# Patient Record
Sex: FEMALE | Race: WHITE | Employment: PART TIME | URBAN - METROPOLITAN AREA
[De-identification: names, ages, dates, MRNs, and addresses within clinical notes are randomized per-mention and may not be internally consistent; named-entity substitution may affect disease eponyms.]

---

## 2021-10-28 ENCOUNTER — HOSPITAL ENCOUNTER (OUTPATIENT)
Age: 41
Setting detail: OUTPATIENT SURGERY
Discharge: HOME | End: 2021-10-28
Attending: PAIN MEDICINE | Admitting: PAIN MEDICINE
Payer: COMMERCIAL

## 2021-10-28 VITALS
SYSTOLIC BLOOD PRESSURE: 115 MMHG | OXYGEN SATURATION: 100 % | HEART RATE: 57 BPM | RESPIRATION RATE: 14 BRPM | DIASTOLIC BLOOD PRESSURE: 60 MMHG

## 2021-10-28 PROCEDURE — 2500000003 HC RX 250 WO HCPCS

## 2021-10-28 PROCEDURE — 2500000003 HC RX 250 WO HCPCS: Performed by: PAIN MEDICINE

## 2021-10-28 PROCEDURE — 6360000004 HC RX CONTRAST MEDICATION: Performed by: PAIN MEDICINE

## 2021-10-28 PROCEDURE — 3600000002 HC SURGERY LEVEL 2 BASE: Performed by: PAIN MEDICINE

## 2021-10-28 PROCEDURE — 2709999900 HC NON-CHARGEABLE SUPPLY: Performed by: PAIN MEDICINE

## 2021-10-28 PROCEDURE — 2580000003 HC RX 258: Performed by: PAIN MEDICINE

## 2021-10-28 PROCEDURE — 6360000002 HC RX W HCPCS: Performed by: PAIN MEDICINE

## 2021-10-28 PROCEDURE — 62321 NJX INTERLAMINAR CRV/THRC: CPT | Performed by: PAIN MEDICINE

## 2021-10-28 PROCEDURE — 7100000011 HC PHASE II RECOVERY - ADDTL 15 MIN: Performed by: PAIN MEDICINE

## 2021-10-28 PROCEDURE — 7100000010 HC PHASE II RECOVERY - FIRST 15 MIN: Performed by: PAIN MEDICINE

## 2021-10-28 PROCEDURE — 99152 MOD SED SAME PHYS/QHP 5/>YRS: CPT | Performed by: PAIN MEDICINE

## 2021-10-28 RX ORDER — SODIUM CHLORIDE, SODIUM LACTATE, POTASSIUM CHLORIDE, CALCIUM CHLORIDE 600; 310; 30; 20 MG/100ML; MG/100ML; MG/100ML; MG/100ML
INJECTION, SOLUTION INTRAVENOUS CONTINUOUS
Status: DISCONTINUED | OUTPATIENT
Start: 2021-10-28 | End: 2021-10-28 | Stop reason: HOSPADM

## 2021-10-28 RX ORDER — METHYLPREDNISOLONE ACETATE 80 MG/ML
INJECTION, SUSPENSION INTRA-ARTICULAR; INTRALESIONAL; INTRAMUSCULAR; SOFT TISSUE
Status: DISCONTINUED
Start: 2021-10-28 | End: 2021-10-28 | Stop reason: HOSPADM

## 2021-10-28 RX ORDER — IBUPROFEN 400 MG/1
400 TABLET ORAL EVERY 6 HOURS PRN
COMMUNITY

## 2021-10-28 RX ORDER — FENTANYL CITRATE 50 UG/ML
INJECTION, SOLUTION INTRAMUSCULAR; INTRAVENOUS
Status: DISCONTINUED
Start: 2021-10-28 | End: 2021-10-28 | Stop reason: HOSPADM

## 2021-10-28 RX ORDER — QUETIAPINE FUMARATE 25 MG/1
25 TABLET, FILM COATED ORAL
COMMUNITY

## 2021-10-28 RX ORDER — MIDAZOLAM HYDROCHLORIDE 1 MG/ML
INJECTION INTRAMUSCULAR; INTRAVENOUS PRN
Status: DISCONTINUED | OUTPATIENT
Start: 2021-10-28 | End: 2021-10-28 | Stop reason: ALTCHOICE

## 2021-10-28 RX ORDER — LIDOCAINE HYDROCHLORIDE 10 MG/ML
INJECTION, SOLUTION EPIDURAL; INFILTRATION; INTRACAUDAL; PERINEURAL
Status: COMPLETED
Start: 2021-10-28 | End: 2021-10-28

## 2021-10-28 RX ORDER — METHYLPREDNISOLONE ACETATE 80 MG/ML
INJECTION, SUSPENSION INTRA-ARTICULAR; INTRALESIONAL; INTRAMUSCULAR; SOFT TISSUE PRN
Status: DISCONTINUED | OUTPATIENT
Start: 2021-10-28 | End: 2021-10-28 | Stop reason: ALTCHOICE

## 2021-10-28 RX ORDER — LIDOCAINE HYDROCHLORIDE 10 MG/ML
5 INJECTION, SOLUTION EPIDURAL; INFILTRATION; INTRACAUDAL; PERINEURAL ONCE
Status: COMPLETED | OUTPATIENT
Start: 2021-10-28 | End: 2021-10-28

## 2021-10-28 RX ORDER — FENTANYL CITRATE 50 UG/ML
INJECTION, SOLUTION INTRAMUSCULAR; INTRAVENOUS PRN
Status: DISCONTINUED | OUTPATIENT
Start: 2021-10-28 | End: 2021-10-28 | Stop reason: ALTCHOICE

## 2021-10-28 RX ORDER — FLUOXETINE HYDROCHLORIDE 20 MG/1
20 CAPSULE ORAL DAILY
Status: ON HOLD | COMMUNITY
End: 2022-04-07

## 2021-10-28 RX ORDER — MIDAZOLAM HYDROCHLORIDE 1 MG/ML
INJECTION INTRAMUSCULAR; INTRAVENOUS
Status: DISCONTINUED
Start: 2021-10-28 | End: 2021-10-28 | Stop reason: HOSPADM

## 2021-10-28 RX ORDER — ALPRAZOLAM 0.5 MG/1
0.5 TABLET ORAL NIGHTLY PRN
COMMUNITY

## 2021-10-28 RX ORDER — LIDOCAINE HYDROCHLORIDE 10 MG/ML
INJECTION, SOLUTION EPIDURAL; INFILTRATION; INTRACAUDAL; PERINEURAL PRN
Status: DISCONTINUED | OUTPATIENT
Start: 2021-10-28 | End: 2021-10-28 | Stop reason: ALTCHOICE

## 2021-10-28 RX ADMIN — SODIUM CHLORIDE, POTASSIUM CHLORIDE, SODIUM LACTATE AND CALCIUM CHLORIDE: 600; 310; 30; 20 INJECTION, SOLUTION INTRAVENOUS at 16:09

## 2021-10-28 RX ADMIN — LIDOCAINE HYDROCHLORIDE 0.1 ML: 10 INJECTION, SOLUTION EPIDURAL; INFILTRATION; INTRACAUDAL; PERINEURAL at 16:01

## 2021-10-28 ASSESSMENT — PAIN SCALES - GENERAL: PAINLEVEL_OUTOF10: 4

## 2021-10-28 ASSESSMENT — PAIN DESCRIPTION - LOCATION: LOCATION: NECK;SHOULDER

## 2021-10-28 ASSESSMENT — PAIN DESCRIPTION - ORIENTATION: ORIENTATION: LEFT

## 2021-10-28 NOTE — PROGRESS NOTES
Discharge instructions reviewed with pt's family- states understanding. Pt states ready for discharge.

## 2021-10-28 NOTE — PROCEDURES
Nahid Cat is a 37 y.o. female patient. No diagnosis found. No past medical history on file. Blood pressure 130/84, pulse 80, SpO2 100 %. Ila Solis MD  10/28/2021      DICTATING PHYSICIAN: Maria Del Rosario Meehan M.D        PREOPERATIVE DIAGNOSES: Cervical radiculopathy 723.4, M54.12       POSTOPERATIVE DIAGNOSES: Cervical radiculopathy       OPERATIVE PROCEDURES:  Cervical epidural steroid injection under fluoroscopic guidance at _L C67__ level, intra-laminar approach. [facility] with [address] [place] [address] Road: Maria Del Rosario Meehan M.D   INDICATIONS:  Cervical radiculopathy  OPERATIVE PROCEDURE:  Consent signed by patient after risks and benefits explained. Patient was in prone position. Neck was prepped with Prevail and draped. Aseptic technique used at every stage of the procedure. Skin wheal with 1% Lidocaine with 30-gauge needle. _20__ -gauge Tuohy needle placed under AP fluoroscopic guidance to contact the superior part of the lamina at _C7__ level on the _L__ side. The spinous process was in midline. The needle was then advanced anteriorly and superiorly under fluoroscopic guidance into the interlaminar epidural space with the help of loss of resistance technique. Aspiration was negative for CSF and blood. Injection of Omnipaque dye(0.5cc) showed appropriate spread confirming epidural needle placement. _0_ cc of dye with _80__ mg of DEPOMEDROL was injected. Needle pulled out intact. Patient tolerated procedure well. Discharge instructions were given. Injection under low pressure. Patient was monitored and stable.      ESTIMATED BLOOD LOSS:  Less than 1 cc    <<Signature User>>   ________________________________  Maria Del Rosario Meehan M.D.

## 2022-04-07 ENCOUNTER — HOSPITAL ENCOUNTER (OUTPATIENT)
Age: 42
Setting detail: OUTPATIENT SURGERY
Discharge: HOME | End: 2022-04-07
Attending: PAIN MEDICINE | Admitting: PAIN MEDICINE
Payer: COMMERCIAL

## 2022-04-07 VITALS
SYSTOLIC BLOOD PRESSURE: 108 MMHG | RESPIRATION RATE: 16 BRPM | TEMPERATURE: 97.7 F | DIASTOLIC BLOOD PRESSURE: 65 MMHG | BODY MASS INDEX: 23.32 KG/M2 | HEART RATE: 62 BPM | HEIGHT: 65 IN | WEIGHT: 140 LBS | OXYGEN SATURATION: 99 %

## 2022-04-07 PROCEDURE — 6360000004 HC RX CONTRAST MEDICATION: Performed by: PAIN MEDICINE

## 2022-04-07 PROCEDURE — 7100000011 HC PHASE II RECOVERY - ADDTL 15 MIN: Performed by: PAIN MEDICINE

## 2022-04-07 PROCEDURE — 3600000012 HC SURGERY LEVEL 2 ADDTL 15MIN: Performed by: PAIN MEDICINE

## 2022-04-07 PROCEDURE — 7100000010 HC PHASE II RECOVERY - FIRST 15 MIN: Performed by: PAIN MEDICINE

## 2022-04-07 PROCEDURE — 2709999900 HC NON-CHARGEABLE SUPPLY: Performed by: PAIN MEDICINE

## 2022-04-07 PROCEDURE — 62321 NJX INTERLAMINAR CRV/THRC: CPT | Performed by: PAIN MEDICINE

## 2022-04-07 PROCEDURE — 2500000003 HC RX 250 WO HCPCS: Performed by: PAIN MEDICINE

## 2022-04-07 PROCEDURE — 6360000002 HC RX W HCPCS: Performed by: PAIN MEDICINE

## 2022-04-07 PROCEDURE — 99152 MOD SED SAME PHYS/QHP 5/>YRS: CPT | Performed by: PAIN MEDICINE

## 2022-04-07 PROCEDURE — 2580000003 HC RX 258: Performed by: PAIN MEDICINE

## 2022-04-07 PROCEDURE — 3600000002 HC SURGERY LEVEL 2 BASE: Performed by: PAIN MEDICINE

## 2022-04-07 RX ORDER — MIDAZOLAM HYDROCHLORIDE 1 MG/ML
INJECTION INTRAMUSCULAR; INTRAVENOUS
Status: DISCONTINUED
Start: 2022-04-07 | End: 2022-04-07 | Stop reason: HOSPADM

## 2022-04-07 RX ORDER — SODIUM CHLORIDE, SODIUM LACTATE, POTASSIUM CHLORIDE, CALCIUM CHLORIDE 600; 310; 30; 20 MG/100ML; MG/100ML; MG/100ML; MG/100ML
INJECTION, SOLUTION INTRAVENOUS CONTINUOUS
Status: DISCONTINUED | OUTPATIENT
Start: 2022-04-07 | End: 2022-04-07 | Stop reason: HOSPADM

## 2022-04-07 RX ORDER — MIDAZOLAM HYDROCHLORIDE 1 MG/ML
INJECTION INTRAMUSCULAR; INTRAVENOUS PRN
Status: DISCONTINUED | OUTPATIENT
Start: 2022-04-07 | End: 2022-04-07 | Stop reason: ALTCHOICE

## 2022-04-07 RX ORDER — BETAMETHASONE SODIUM PHOSPHATE AND BETAMETHASONE ACETATE 3; 3 MG/ML; MG/ML
INJECTION, SUSPENSION INTRA-ARTICULAR; INTRALESIONAL; INTRAMUSCULAR; SOFT TISSUE
Status: DISCONTINUED
Start: 2022-04-07 | End: 2022-04-07 | Stop reason: HOSPADM

## 2022-04-07 RX ORDER — LIDOCAINE HYDROCHLORIDE 10 MG/ML
INJECTION, SOLUTION EPIDURAL; INFILTRATION; INTRACAUDAL; PERINEURAL PRN
Status: DISCONTINUED | OUTPATIENT
Start: 2022-04-07 | End: 2022-04-07 | Stop reason: ALTCHOICE

## 2022-04-07 RX ADMIN — SODIUM CHLORIDE, POTASSIUM CHLORIDE, SODIUM LACTATE AND CALCIUM CHLORIDE: 600; 310; 30; 20 INJECTION, SOLUTION INTRAVENOUS at 12:58

## 2022-04-07 RX ADMIN — LIDOCAINE HYDROCHLORIDE 0.1 ML: 10 INJECTION, SOLUTION EPIDURAL; INFILTRATION; INTRACAUDAL; PERINEURAL at 12:58

## 2022-04-07 ASSESSMENT — PAIN - FUNCTIONAL ASSESSMENT
PAIN_FUNCTIONAL_ASSESSMENT: PREVENTS OR INTERFERES WITH MANY ACTIVE NOT PASSIVE ACTIVITIES
PAIN_FUNCTIONAL_ASSESSMENT: 0-10

## 2022-04-07 NOTE — PROCEDURES
Walt Marley is a 37 y.o. female patient. No diagnosis found. History reviewed. No pertinent past medical history. Blood pressure 93/64, pulse 70, temperature 97.7 °F (36.5 °C), temperature source Temporal, resp. rate 16, height 5' 5\" (1.651 m), weight 140 lb (63.5 kg), SpO2 96 %. Kristal Al MD  4/7/2022      DICTATING PHYSICIAN: Lesli Kern M.D        PREOPERATIVE DIAGNOSES: Cervical radiculopathy 723.4, M54.12       POSTOPERATIVE DIAGNOSES: Cervical radiculopathy       OPERATIVE PROCEDURES:  Cervical epidural steroid injection under fluoroscopic guidance at _L C67__ level, intra-laminar approach. [id number] with [facility] [address]: Lesli Kern M.D   INDICATIONS:  Cervical radiculopathy  OPERATIVE PROCEDURE:  Consent signed by patient after risks and benefits explained. Patient was in prone position. Neck was prepped with Prevail and draped. Aseptic technique used at every stage of the procedure. Skin wheal with 1% Lidocaine with 30-gauge needle. _20__ -gauge Tuohy needle placed under AP fluoroscopic guidance to contact the superior part of the lamina at _C7__ level on the _L__ side. The spinous process was in midline. The needle was then advanced anteriorly and superiorly under fluoroscopic guidance into the interlaminar epidural space with the help of loss of resistance technique. Aspiration was negative for CSF and blood. Injection of Omnipaque dye(0.5cc) showed appropriate spread confirming epidural needle placement. _0_ cc of dye with _9 mg of CELESTONE was injected. Needle pulled out intact. Patient tolerated procedure well. Discharge instructions were given. Injection under low pressure. Patient was monitored and stable.      ESTIMATED BLOOD LOSS:  Less than 1 cc    <<Signature User>>   ________________________________  Lesli Kern M.D.

## 2022-04-07 NOTE — PROGRESS NOTES
Pt walked out of PACU, tolerable pain. No weakness or tingling. Discharge instructions given to pt, verbalized understanding and states no questions at this time.  driving pt home.

## 2022-06-23 ENCOUNTER — HOSPITAL ENCOUNTER (OUTPATIENT)
Age: 42
Setting detail: OUTPATIENT SURGERY
Discharge: HOME | End: 2022-06-23
Attending: PAIN MEDICINE | Admitting: PAIN MEDICINE
Payer: COMMERCIAL

## 2022-06-23 VITALS
OXYGEN SATURATION: 100 % | BODY MASS INDEX: 23.32 KG/M2 | HEART RATE: 80 BPM | HEIGHT: 65 IN | SYSTOLIC BLOOD PRESSURE: 131 MMHG | DIASTOLIC BLOOD PRESSURE: 87 MMHG | TEMPERATURE: 98.4 F | RESPIRATION RATE: 16 BRPM | WEIGHT: 140 LBS

## 2022-06-23 PROCEDURE — 64491 INJ PARAVERT F JNT C/T 2 LEV: CPT | Performed by: PAIN MEDICINE

## 2022-06-23 PROCEDURE — 3600000002 HC SURGERY LEVEL 2 BASE: Performed by: PAIN MEDICINE

## 2022-06-23 PROCEDURE — 7100000011 HC PHASE II RECOVERY - ADDTL 15 MIN: Performed by: PAIN MEDICINE

## 2022-06-23 PROCEDURE — 7100000010 HC PHASE II RECOVERY - FIRST 15 MIN: Performed by: PAIN MEDICINE

## 2022-06-23 PROCEDURE — 64490 INJ PARAVERT F JNT C/T 1 LEV: CPT | Performed by: PAIN MEDICINE

## 2022-06-23 PROCEDURE — 2709999900 HC NON-CHARGEABLE SUPPLY: Performed by: PAIN MEDICINE

## 2022-06-23 PROCEDURE — 2500000003 HC RX 250 WO HCPCS: Performed by: PAIN MEDICINE

## 2022-06-23 RX ORDER — ESCITALOPRAM OXALATE 10 MG/1
10 TABLET ORAL DAILY
COMMUNITY

## 2022-06-23 RX ORDER — BUPIVACAINE HYDROCHLORIDE 7.5 MG/ML
INJECTION, SOLUTION EPIDURAL; RETROBULBAR
Status: DISCONTINUED
Start: 2022-06-23 | End: 2022-06-23 | Stop reason: HOSPADM

## 2022-06-23 RX ORDER — BUPIVACAINE HYDROCHLORIDE 7.5 MG/ML
INJECTION, SOLUTION EPIDURAL; RETROBULBAR PRN
Status: DISCONTINUED | OUTPATIENT
Start: 2022-06-23 | End: 2022-06-23 | Stop reason: ALTCHOICE

## 2022-06-23 RX ORDER — LIDOCAINE HYDROCHLORIDE 10 MG/ML
INJECTION, SOLUTION EPIDURAL; INFILTRATION; INTRACAUDAL; PERINEURAL PRN
Status: DISCONTINUED | OUTPATIENT
Start: 2022-06-23 | End: 2022-06-23 | Stop reason: ALTCHOICE

## 2022-06-23 ASSESSMENT — PAIN - FUNCTIONAL ASSESSMENT
PAIN_FUNCTIONAL_ASSESSMENT: 0-10
PAIN_FUNCTIONAL_ASSESSMENT: PREVENTS OR INTERFERES WITH MANY ACTIVE NOT PASSIVE ACTIVITIES

## 2022-06-23 NOTE — PROGRESS NOTES
Patient is very impressed. Pain has eased a lot and has great range of motion now. Rates it at least 90% better. Dr. Livier Ronquillo aware.

## 2022-06-23 NOTE — PROCEDURES
Jana Gutierrez is a 40 y.o. female patient. No diagnosis found. History reviewed. No pertinent past medical history. Blood pressure (!) 148/86, pulse 80, temperature 98.4 °F (36.9 °C), temperature source Temporal, resp. rate 18, height 5' 5\" (1.651 m), weight 140 lb (63.5 kg), SpO2 100 %. Procedures    Lars Holguin MD  6/23/2022  INDICATIONS:  Cervical Spondylosis 721.0, M47.812  CERVICAL MEDIAL BRANCH BLOCK AT L C4,C5,C6,C7 LEVEL TO BLOCK THE C45,C56,C67 FACET JOINTS     07628 and 68781 with [id number],     Consent signed by patient after risks and benefits explained. Patient was in prone position. Back was prepped with Prevail and draped. Aseptic technique used at every stage of the procedure. Skin wheal with 1% Lidocaine with 30-gauge needle. A _22__-gauge, _5__-inch spinal needle was placed under fluoroscopic guidance using AP views at the lateral aspect of the waist of the articular pillar at the _C7__ level on the _L__ side. Then, 0.5 cc of 0.75% Marcaine was injected after negative aspiration for blood or CSF. Needle pulled out intact. Exact similar procedure was performed at _L C4,C5,C6__ levels. Patient tolerated procedure well and discharge instructions were given. Pulse Ox Monitoring was done.   100% RELIEF WITH SIG INCREASE IN SPINAL ROM      ESTIMATED BLOOD LOSS:  Less than 1 cc             ________________________________                   Gracie Tomas M.D.

## 2022-06-23 NOTE — PROGRESS NOTES
Discharge  instructions reviewed. Pt family verbalize understanding with no further questions. VSS. Pt discharged ambulatory to car. Assessment unchanged.

## 2022-07-20 PROBLEM — M48.02 CERVICAL STENOSIS OF SPINAL CANAL: Status: ACTIVE | Noted: 2022-07-20

## 2022-07-20 PROBLEM — M50.20 CERVICAL DISC DISPLACEMENT: Status: ACTIVE | Noted: 2022-07-20

## 2022-08-18 ENCOUNTER — HOSPITAL ENCOUNTER (OUTPATIENT)
Age: 42
Setting detail: OUTPATIENT SURGERY
Discharge: HOME | End: 2022-08-18
Attending: PAIN MEDICINE | Admitting: PAIN MEDICINE
Payer: COMMERCIAL

## 2022-08-18 VITALS
DIASTOLIC BLOOD PRESSURE: 82 MMHG | RESPIRATION RATE: 18 BRPM | HEART RATE: 65 BPM | TEMPERATURE: 97.8 F | SYSTOLIC BLOOD PRESSURE: 124 MMHG | WEIGHT: 144 LBS | OXYGEN SATURATION: 100 % | BODY MASS INDEX: 23.99 KG/M2 | HEIGHT: 65 IN

## 2022-08-18 PROCEDURE — 64491 INJ PARAVERT F JNT C/T 2 LEV: CPT | Performed by: PAIN MEDICINE

## 2022-08-18 PROCEDURE — 7100000010 HC PHASE II RECOVERY - FIRST 15 MIN: Performed by: PAIN MEDICINE

## 2022-08-18 PROCEDURE — 64490 INJ PARAVERT F JNT C/T 1 LEV: CPT | Performed by: PAIN MEDICINE

## 2022-08-18 PROCEDURE — 3600000002 HC SURGERY LEVEL 2 BASE: Performed by: PAIN MEDICINE

## 2022-08-18 PROCEDURE — 2500000003 HC RX 250 WO HCPCS: Performed by: PAIN MEDICINE

## 2022-08-18 PROCEDURE — 2709999900 HC NON-CHARGEABLE SUPPLY: Performed by: PAIN MEDICINE

## 2022-08-18 RX ORDER — BUPIVACAINE HYDROCHLORIDE 7.5 MG/ML
INJECTION, SOLUTION EPIDURAL; RETROBULBAR PRN
Status: DISCONTINUED | OUTPATIENT
Start: 2022-08-18 | End: 2022-08-18 | Stop reason: ALTCHOICE

## 2022-08-18 RX ORDER — LIDOCAINE HYDROCHLORIDE 10 MG/ML
INJECTION, SOLUTION EPIDURAL; INFILTRATION; INTRACAUDAL; PERINEURAL PRN
Status: DISCONTINUED | OUTPATIENT
Start: 2022-08-18 | End: 2022-08-18 | Stop reason: ALTCHOICE

## 2022-08-18 RX ORDER — BUPIVACAINE HYDROCHLORIDE 7.5 MG/ML
INJECTION, SOLUTION EPIDURAL; RETROBULBAR
Status: DISCONTINUED
Start: 2022-08-18 | End: 2022-08-18 | Stop reason: HOSPADM

## 2022-08-18 ASSESSMENT — PAIN DESCRIPTION - DESCRIPTORS: DESCRIPTORS: SHARP;DULL;THROBBING

## 2022-08-18 ASSESSMENT — PAIN - FUNCTIONAL ASSESSMENT
PAIN_FUNCTIONAL_ASSESSMENT: PREVENTS OR INTERFERES SOME ACTIVE ACTIVITIES AND ADLS
PAIN_FUNCTIONAL_ASSESSMENT: 0-10

## 2022-08-18 NOTE — PROGRESS NOTES
Discharge  instructions reviewed. Pt and family verbalize understanding with no further questions. VSS. Pt discharged via [facility] to car. Assessment unchanged.

## 2022-08-18 NOTE — PROCEDURES
Elza Gutierrez is a 40 y.o. female patient. No diagnosis found. History reviewed. No pertinent past medical history. Blood pressure 126/60, pulse 74, temperature 97.8 °F (36.6 °C), temperature source Temporal, resp. rate 18, height 5' 5\" (1.651 m), weight 144 lb (65.3 kg), SpO2 100 %. Procedures    Irlanda Santos MD  8/18/2022    INDICATIONS:  Cervical Spondylosis 721.0, M47.812  CERVICAL MEDIAL BRANCH BLOCK AT L C4,C5,C6,C7 LEVEL TO BLOCK THE C45,C56,C67 FACET JOINTS     65412 and 07197 with [id number],     Consent signed by patient after risks and benefits explained. Patient was in prone position. Back was prepped with Prevail and draped. Aseptic technique used at every stage of the procedure. Skin wheal with 1% Lidocaine with 30-gauge needle. A _22__-gauge, _5__-inch spinal needle was placed under fluoroscopic guidance using AP views at the lateral aspect of the waist of the articular pillar at the _C7__ level on the _L__ side. Then, 0.5 cc of 0.75% Marcaine was injected after negative aspiration for blood or CSF. Needle pulled out intact. Exact similar procedure was performed at _L C4,C5,C6__ levels. Patient tolerated procedure well and discharge instructions were given. Pulse Ox Monitoring was done.   80% RELIEF WITH SIG INCREASE IN SPINAL ROM      ESTIMATED BLOOD LOSS:  Less than 1 cc             ________________________________                   Gonzales Aguiar M.D.

## 2022-08-18 NOTE — DISCHARGE INSTRUCTIONS
[facility]    [phone]    Post Pain Management Injection    PATIENT INSTRUCTIONS:     -Resume Normal Diet  -Other    ACTIVITY:    -No driving or operating machinery for 8 hours post procedure without sedation and 24 hours with sedation. If you are seen driving during this time the proper authorities will be notified.  -Do not stay alone for 4-6 hours after the procedure.  -If you have had IV sedation, do not sign legal documents, make any major decisions, or be involved in work decisions for the remainder of the day. -May shower or bathe.  -Resume normal activity when full movement/sensation has returned in extremities. 3)  SITE CARE:    -Observe puncture site for signs of infection (redness, warmth swelling, drainage with a foul odor, fever or increased tenderness). 4)  EXPECTED SIDE EFFECTS:    -Numbness/tingling/weakness in extremities, if this lasts more than 6 hours notify Dr. Ravin Queen. -Muscle stiffness, soreness at puncture site (soreness may last 2-4 days). -pre procedure pain           5)  TO REACH DR. Dallin Espinosa: Call [phone]    6)  ADDITIONAL INSTRUCTIONS:    Follow-up as scheduled or call for appointment if not already done. Patients taking Coumadin may resume taking as before the procedure.

## 2022-10-20 ENCOUNTER — HOSPITAL ENCOUNTER (OUTPATIENT)
Age: 42
Setting detail: OUTPATIENT SURGERY
Discharge: HOME | End: 2022-10-20
Attending: PAIN MEDICINE | Admitting: PAIN MEDICINE
Payer: COMMERCIAL

## 2022-10-20 VITALS
WEIGHT: 150 LBS | RESPIRATION RATE: 16 BRPM | HEIGHT: 65 IN | HEART RATE: 53 BPM | BODY MASS INDEX: 24.99 KG/M2 | TEMPERATURE: 98.2 F | DIASTOLIC BLOOD PRESSURE: 79 MMHG | SYSTOLIC BLOOD PRESSURE: 115 MMHG | OXYGEN SATURATION: 100 %

## 2022-10-20 PROCEDURE — 7100000011 HC PHASE II RECOVERY - ADDTL 15 MIN: Performed by: PAIN MEDICINE

## 2022-10-20 PROCEDURE — 77003 FLUOROGUIDE FOR SPINE INJECT: CPT | Performed by: PAIN MEDICINE

## 2022-10-20 PROCEDURE — 64634 DESTROY C/TH FACET JNT ADDL: CPT | Performed by: PAIN MEDICINE

## 2022-10-20 PROCEDURE — 64633 DESTROY CERV/THOR FACET JNT: CPT | Performed by: PAIN MEDICINE

## 2022-10-20 PROCEDURE — 99153 MOD SED SAME PHYS/QHP EA: CPT | Performed by: PAIN MEDICINE

## 2022-10-20 PROCEDURE — 3600000002 HC SURGERY LEVEL 2 BASE: Performed by: PAIN MEDICINE

## 2022-10-20 PROCEDURE — 99152 MOD SED SAME PHYS/QHP 5/>YRS: CPT | Performed by: PAIN MEDICINE

## 2022-10-20 PROCEDURE — 2709999900 HC NON-CHARGEABLE SUPPLY: Performed by: PAIN MEDICINE

## 2022-10-20 PROCEDURE — 2580000003 HC RX 258: Performed by: PAIN MEDICINE

## 2022-10-20 PROCEDURE — 3600000012 HC SURGERY LEVEL 2 ADDTL 15MIN: Performed by: PAIN MEDICINE

## 2022-10-20 PROCEDURE — 7100000010 HC PHASE II RECOVERY - FIRST 15 MIN: Performed by: PAIN MEDICINE

## 2022-10-20 PROCEDURE — 2500000003 HC RX 250 WO HCPCS: Performed by: PAIN MEDICINE

## 2022-10-20 RX ORDER — MIDAZOLAM HYDROCHLORIDE 1 MG/ML
INJECTION INTRAMUSCULAR; INTRAVENOUS
Status: DISCONTINUED
Start: 2022-10-20 | End: 2022-10-20 | Stop reason: HOSPADM

## 2022-10-20 RX ORDER — LIDOCAINE HYDROCHLORIDE 20 MG/ML
INJECTION, SOLUTION EPIDURAL; INFILTRATION; INTRACAUDAL; PERINEURAL
Status: DISCONTINUED
Start: 2022-10-20 | End: 2022-10-20 | Stop reason: HOSPADM

## 2022-10-20 RX ORDER — SODIUM CHLORIDE, SODIUM LACTATE, POTASSIUM CHLORIDE, CALCIUM CHLORIDE 600; 310; 30; 20 MG/100ML; MG/100ML; MG/100ML; MG/100ML
INJECTION, SOLUTION INTRAVENOUS CONTINUOUS
Status: DISCONTINUED | OUTPATIENT
Start: 2022-10-20 | End: 2022-10-20 | Stop reason: HOSPADM

## 2022-10-20 RX ORDER — LIDOCAINE HYDROCHLORIDE 20 MG/ML
INJECTION, SOLUTION EPIDURAL; INFILTRATION; INTRACAUDAL; PERINEURAL PRN
Status: DISCONTINUED | OUTPATIENT
Start: 2022-10-20 | End: 2022-10-20 | Stop reason: ALTCHOICE

## 2022-10-20 RX ADMIN — SODIUM CHLORIDE, SODIUM LACTATE, POTASSIUM CHLORIDE, AND CALCIUM CHLORIDE: 600; 310; 30; 20 INJECTION, SOLUTION INTRAVENOUS at 13:22

## 2022-10-20 RX ADMIN — LIDOCAINE HYDROCHLORIDE 0.1 ML: 10 INJECTION, SOLUTION EPIDURAL; INFILTRATION; INTRACAUDAL; PERINEURAL at 13:23

## 2022-10-20 ASSESSMENT — PAIN SCALES - GENERAL
PAINLEVEL_OUTOF10: 7
PAINLEVEL_OUTOF10: 7
PAINLEVEL_OUTOF10: 5

## 2022-10-20 ASSESSMENT — PAIN DESCRIPTION - DESCRIPTORS: DESCRIPTORS: ACHING;DISCOMFORT;STABBING;SHOOTING

## 2022-10-20 NOTE — PROCEDURES
Yevette Barthel is a 40 y.o. female patient. No diagnosis found. History reviewed. No pertinent past medical history. Blood pressure 114/74, pulse 73, temperature 98.2 °F (36.8 °C), temperature source Infrared, resp. rate 15, height 5' 5\" (1.651 m), weight 150 lb (68 kg), SpO2 98 %. Yraeli Kuhn MD  10/20/2022    DICTATING PHYSICIAN: Ina Roland M.D        PREOPERATIVE DIAGNOSES: Cervical Spondylosis       POSTOPERATIVE DIAGNOSES: Cervical Spondylosis 721.0, M47.12       OPERATIVE PROCEDURES:  Radiofrequency Neurotomy of medial branches at _L  C4,C5,C6,C7__ levels to block the C45, C56 and C67 facet joints. 11732 and 67024 with 28634,          SURGEON: Ina Roland M.D   INDICATIONS:  Cervical Spondylosis  OPERATIVE PROCEDURE:  Consent signed by patient after risks and benefits explained. Patient in prone position. Back was prepped with Prevail and draped. Aseptic technique used at every stage of the procedure. Skin wheal with 1% Lidocaine with 30g needle. _20__ g, _100__ mm, curved tip radiofrequency needle with _10__ mm active tip was placed under fluoroscopic guidance using A.P. views at the lateral aspect of the midpoint of the waist of facet pillar at _C7__ level. Then the needle was walked off laterally and anteriorly by couple of millimeters. In the lateral view, the needle was adjusted so that the tip of the needle was posterior to the midpoint of the facet column. Sensory stim with 50hz at 0.5 v did not create any pain down the arm. Motor stimulation at 2 hertz at 2 volts did not produce any muscle contraction in the_L__ arm. 0.2 cc of 1% lidocaine was injected and radiofrequency lesioning was done for 90 seconds at 80 degree centigrade. Needle pulled out intact. Exact similar procedure was performed at _L C4,C5,C6__ levels. Paraspinal muscle twitching was seen at _ALL__ levels. Patient did not feel radicular pain either during needle placement or during lesioning.  Patient tolerated the procedure well. Intravenous sedation was / was not used. __2 mg of Versed was used.  Patient was monitored and stable    ESTIMATED BLOOD LOSS:  Less than 1 cc

## 2022-10-20 NOTE — LETTER
[facility]  [address]  [facility] [address]  Phone: [phone]    No name on file. October 20, 2022     Patient: Veronica Mendoza   YOB: 1980   Date of Visit: 10/17/2022       To Whom It May Concern: It is my medical opinion that Armando Pea should be excused from work for 10/20-21/2022, due to a procedure on 10/20/2022. If you have any questions or concerns, please don't hesitate to call.     Sincerely,    Dr Zakia Kebede

## 2022-10-20 NOTE — DISCHARGE INSTRUCTIONS
[address] [facility]          [phone]          Post Radiofrequency for Dr. Valentino Flight        Pain may be worse 1-2 weeks post procedure. Take pain medicine as needed. This includes pain at needle puncture site(s), mild increased neck or back stiffness, or deep back or neck pain. Treatments for mild post procedure surgery include relative rest for 3-4 days; avoid movements which aggravate pain, and applying cold compresses. Contact Dr. Valentino Flight if you develop a fever >102, new severe unremitting lower back pain, new persistent weakness, or new bowel or bladder incontinence. You will need to follow up with Dr. Valentino Flight within 4 weeks after the procedure. No driving day of procedure. If you are seen driving during this time the proper authorities will be notified. Do not sign legal documents, make any major decisions, or be involved in work decisions for the remainder of the day. Do not stay alone for 4-6 hours after the procedure.     TO Terrance Carlos 3 [phone] TO MAKE A FOLLOW UP APPOINTMENT

## 2022-10-20 NOTE — PROGRESS NOTES
Discharge  instructions reviewed. Pt and family verbalize understanding with no further questions. VSS. Pt discharged via [facility] to car. Assessment unchanged.

## 2023-12-27 LAB — MAMMOGRAPHY, EXTERNAL: NORMAL

## 2024-07-10 PROBLEM — M79.18 MYOFASCIAL PAIN: Status: ACTIVE | Noted: 2024-07-10

## 2024-11-15 ENCOUNTER — OFFICE VISIT (OUTPATIENT)
Dept: INTERNAL MEDICINE CLINIC | Age: 44
End: 2024-11-15

## 2024-11-15 VITALS
HEIGHT: 65 IN | HEART RATE: 77 BPM | DIASTOLIC BLOOD PRESSURE: 70 MMHG | WEIGHT: 191 LBS | SYSTOLIC BLOOD PRESSURE: 120 MMHG | OXYGEN SATURATION: 97 % | BODY MASS INDEX: 31.82 KG/M2

## 2024-11-15 DIAGNOSIS — F11.20 CONTINUOUS OPIOID DEPENDENCE (HCC): ICD-10-CM

## 2024-11-15 DIAGNOSIS — Z12.31 ENCOUNTER FOR SCREENING MAMMOGRAM FOR BREAST CANCER: ICD-10-CM

## 2024-11-15 DIAGNOSIS — F17.200 SMOKING ADDICTION: ICD-10-CM

## 2024-11-15 DIAGNOSIS — M43.02 CERVICAL SPONDYLOLYSIS: Primary | ICD-10-CM

## 2024-11-15 DIAGNOSIS — G89.29 CHRONIC NECK PAIN: ICD-10-CM

## 2024-11-15 DIAGNOSIS — M54.2 CHRONIC NECK PAIN: ICD-10-CM

## 2024-11-15 DIAGNOSIS — F41.1 GAD (GENERALIZED ANXIETY DISORDER): ICD-10-CM

## 2024-11-15 RX ORDER — VARENICLINE TARTRATE 0.5 MG/1
.5-1 TABLET, FILM COATED ORAL SEE ADMIN INSTRUCTIONS
Qty: 57 TABLET | Refills: 0 | Status: SHIPPED | OUTPATIENT
Start: 2024-11-15

## 2024-11-15 RX ORDER — PREGABALIN 100 MG/1
CAPSULE ORAL
COMMUNITY
Start: 2024-04-01

## 2024-11-15 RX ORDER — SERTRALINE HYDROCHLORIDE 100 MG/1
100 TABLET, FILM COATED ORAL DAILY
COMMUNITY
Start: 2024-04-01

## 2024-11-15 SDOH — ECONOMIC STABILITY: INCOME INSECURITY: HOW HARD IS IT FOR YOU TO PAY FOR THE VERY BASICS LIKE FOOD, HOUSING, MEDICAL CARE, AND HEATING?: PATIENT DECLINED

## 2024-11-15 SDOH — ECONOMIC STABILITY: FOOD INSECURITY: WITHIN THE PAST 12 MONTHS, YOU WORRIED THAT YOUR FOOD WOULD RUN OUT BEFORE YOU GOT MONEY TO BUY MORE.: PATIENT DECLINED

## 2024-11-15 SDOH — ECONOMIC STABILITY: FOOD INSECURITY: WITHIN THE PAST 12 MONTHS, THE FOOD YOU BOUGHT JUST DIDN'T LAST AND YOU DIDN'T HAVE MONEY TO GET MORE.: PATIENT DECLINED

## 2024-11-15 ASSESSMENT — PATIENT HEALTH QUESTIONNAIRE - PHQ9
SUM OF ALL RESPONSES TO PHQ QUESTIONS 1-9: 2
2. FEELING DOWN, DEPRESSED OR HOPELESS: SEVERAL DAYS
SUM OF ALL RESPONSES TO PHQ QUESTIONS 1-9: 2
SUM OF ALL RESPONSES TO PHQ QUESTIONS 1-9: 2
SUM OF ALL RESPONSES TO PHQ9 QUESTIONS 1 & 2: 2
1. LITTLE INTEREST OR PLEASURE IN DOING THINGS: SEVERAL DAYS
SUM OF ALL RESPONSES TO PHQ QUESTIONS 1-9: 2

## 2024-11-15 NOTE — PROGRESS NOTES
is at baseline.   Psychiatric:         Mood and Affect: Mood normal.            On this date 11/15/2024 I have spent over 45 minutes reviewing previous notes, test results and face to face with the patient discussing the diagnosis and importance of compliance with the treatment plan as well as documenting on the day of the visit.      An electronic signature was used to authenticate this note.    --Juana Carlson MD

## 2024-12-04 PROBLEM — G89.29 CHRONIC LEFT SHOULDER PAIN: Status: ACTIVE | Noted: 2024-12-04

## 2024-12-04 PROBLEM — M25.512 CHRONIC LEFT SHOULDER PAIN: Status: ACTIVE | Noted: 2024-12-04

## 2025-02-10 ENCOUNTER — COMMUNITY OUTREACH (OUTPATIENT)
Dept: INTERNAL MEDICINE CLINIC | Age: 45
End: 2025-02-10

## 2025-02-10 NOTE — PROGRESS NOTES
Patient's HM shows they are overdue for Mammogram.  Care Everywhere and  files searched.   updated with 12/27/23 Mammogram.

## (undated) DEVICE — SOLUTION SCRB 3% CHLOROXYLENOL BLU ANTIMIC SKIN CLN

## (undated) DEVICE — Z DISCONTINUED USE 2432103 SOLUTION PREP PVP-I W/ APPL URETHANE COT TIP SPNG

## (undated) DEVICE — Z DISCONTINUED (SUB = MFG CAT 4422) SPONGE GZ 4X4 IN 8 PLY NS

## (undated) DEVICE — GAUZE,SPONGE,4"X4",16PLY,STRL,LF,10/TRAY: Brand: MEDLINE

## (undated) DEVICE — TOWEL OR BLUEE 16X26IN ST 8 PACK ORB08 16X26ORTWL

## (undated) DEVICE — STERILE POLYISOPRENE POWDER-FREE SURGICAL GLOVES: Brand: PROTEXIS

## (undated) DEVICE — ALCOHOL RUBBING 16OZ 70% ISO

## (undated) DEVICE — UNIVERSAL BLOCK TRAY: Brand: MEDLINE INDUSTRIES, INC.

## (undated) DEVICE — AVANOS* UNIVERSAL BLOCK TRAYS: Brand: AVANOS

## (undated) DEVICE — APPLICATOR PREP 26ML 0.7% IOD POVACRYLEX 74% ISO ALC ST